# Patient Record
Sex: MALE | Race: WHITE | NOT HISPANIC OR LATINO | Employment: OTHER | URBAN - METROPOLITAN AREA
[De-identification: names, ages, dates, MRNs, and addresses within clinical notes are randomized per-mention and may not be internally consistent; named-entity substitution may affect disease eponyms.]

---

## 2017-08-16 ENCOUNTER — PREPPED CHART (OUTPATIENT)
Dept: URBAN - METROPOLITAN AREA CLINIC 40 | Facility: CLINIC | Age: 82
End: 2017-08-16

## 2017-08-17 ENCOUNTER — ESTABLISHED PATIENT (OUTPATIENT)
Dept: URBAN - METROPOLITAN AREA CLINIC 40 | Facility: CLINIC | Age: 82
End: 2017-08-17

## 2017-08-17 DIAGNOSIS — H52.4: ICD-10-CM

## 2017-08-17 DIAGNOSIS — H47.323: ICD-10-CM

## 2017-08-17 DIAGNOSIS — H52.203: ICD-10-CM

## 2017-08-17 DIAGNOSIS — H35.3131: ICD-10-CM

## 2017-08-17 DIAGNOSIS — H52.03: ICD-10-CM

## 2017-08-17 PROCEDURE — 92014 COMPRE OPH EXAM EST PT 1/>: CPT

## 2017-08-17 PROCEDURE — 92015 DETERMINE REFRACTIVE STATE: CPT

## 2017-08-17 PROCEDURE — 92250 FUNDUS PHOTOGRAPHY W/I&R: CPT

## 2017-08-17 ASSESSMENT — TONOMETRY
OS_IOP_MMHG: 18
OD_IOP_MMHG: 18

## 2017-08-17 ASSESSMENT — VISUAL ACUITY
OD_BAT: 20/30
OS_BAT: 20/40
OD_SC: 20/25
OD_CC: J2
OS_CC: J3
OU_CC: J1
OU_SC: 20/25-1
OS_SC: 20/30+2

## 2017-08-17 ASSESSMENT — KERATOMETRY
OD_AXISANGLE_DEGREES: 169
OS_K2POWER_DIOPTERS: 44.00
OD_AXISANGLE2_DEGREES: 079
OS_AXISANGLE2_DEGREES: 083
OD_K1POWER_DIOPTERS: 44.25
OS_K1POWER_DIOPTERS: 44.50
OS_AXISANGLE_DEGREES: 173
OD_K2POWER_DIOPTERS: 44.75

## 2018-04-17 NOTE — PATIENT DISCUSSION
pt has many cataract symptoms.  re-check AR/MR at next visit and make sure macular function is normal with 10-2 at next visit.

## 2018-05-21 NOTE — PATIENT DISCUSSION
normal 10-2 function.  today with KMS MR sees better than LV.  Spec Rx given at this point and if cataract shifts Rx again will need to address.

## 2018-08-21 ENCOUNTER — ESTABLISHED COMPREHENSIVE EXAM (OUTPATIENT)
Dept: URBAN - METROPOLITAN AREA CLINIC 40 | Facility: CLINIC | Age: 83
End: 2018-08-21

## 2018-08-21 DIAGNOSIS — H47.323: ICD-10-CM

## 2018-08-21 DIAGNOSIS — H35.3131: ICD-10-CM

## 2018-08-21 DIAGNOSIS — H43.393: ICD-10-CM

## 2018-08-21 DIAGNOSIS — H26.493: ICD-10-CM

## 2018-08-21 PROCEDURE — 92250 FUNDUS PHOTOGRAPHY W/I&R: CPT

## 2018-08-21 PROCEDURE — 92015 DETERMINE REFRACTIVE STATE: CPT

## 2018-08-21 PROCEDURE — G9903 PT SCRN TBCO ID AS NON USER: HCPCS

## 2018-08-21 PROCEDURE — 2019F DILATED MACUL EXAM DONE: CPT

## 2018-08-21 PROCEDURE — 4177F TALK PT/CRGVR RE AREDS PREV: CPT

## 2018-08-21 PROCEDURE — 1036F TOBACCO NON-USER: CPT

## 2018-08-21 PROCEDURE — 92014 COMPRE OPH EXAM EST PT 1/>: CPT

## 2018-08-21 PROCEDURE — G8427 DOCREV CUR MEDS BY ELIG CLIN: HCPCS

## 2018-08-21 ASSESSMENT — KERATOMETRY
OD_K1POWER_DIOPTERS: 44.50
OD_AXISANGLE2_DEGREES: 050
OS_AXISANGLE_DEGREES: 157
OS_AXISANGLE2_DEGREES: 067
OD_AXISANGLE_DEGREES: 140
OD_K2POWER_DIOPTERS: 44.75
OS_K1POWER_DIOPTERS: 44.25
OS_K2POWER_DIOPTERS: 45.00

## 2018-08-21 ASSESSMENT — TONOMETRY
OD_IOP_MMHG: 13
OS_IOP_MMHG: 14

## 2018-08-21 ASSESSMENT — VISUAL ACUITY
OS_SC: 20/20-1
OD_SC: 20/30-2
OS_CC: J2
OU_SC: 20/25
OD_CC: J1
OD_BAT: 20/40
OU_CC: J1
OS_BAT: 20/25

## 2018-09-20 NOTE — PATIENT DISCUSSION
New Prescription: Maxitrol (neomycin-polymyxin-dexameth): drops,suspension: 3.5-10,000-0.1 mg/mL-unit/mL-% 1 drop four times a day as directed into both eyes 09-

## 2018-09-20 NOTE — PATIENT DISCUSSION
MUCOPURULENT CONJUNCTIVITIS OU: Maxitrol QID for 1 week. Will follow-up in 1 week. If condition is improving, may consider taper at that time. If no improvement, will use poviodine treatment. Cold compresses QID OU or more.

## 2018-09-27 NOTE — PATIENT DISCUSSION
Continue: Maxitrol (neomycin-polymyxin-dexameth): drops,suspension: 3.5-10,000-0.1 mg/mL-unit/mL-% 1 drop four times a day as directed into both eyes 09-

## 2018-09-27 NOTE — PATIENT DISCUSSION
MUCOPURULENT CONJUNCTIVITIS OU: Symptoms much improved. Cont Maxitrol BID for 1 week then d/c. Cold compresses PRN. Will follow up with patient in 2 weeks for a CEE.

## 2018-10-26 NOTE — PATIENT DISCUSSION
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

## 2019-03-26 ENCOUNTER — EST. PATIENT EMERGENCY (OUTPATIENT)
Dept: URBAN - METROPOLITAN AREA CLINIC 40 | Facility: CLINIC | Age: 84
End: 2019-03-26

## 2019-03-26 DIAGNOSIS — H02.836: ICD-10-CM

## 2019-03-26 DIAGNOSIS — H02.833: ICD-10-CM

## 2019-03-26 PROCEDURE — 92012 INTRM OPH EXAM EST PATIENT: CPT

## 2019-03-26 ASSESSMENT — KERATOMETRY
OS_AXISANGLE2_DEGREES: 067
OS_AXISANGLE_DEGREES: 157
OD_AXISANGLE2_DEGREES: 050
OD_AXISANGLE_DEGREES: 140
OD_K2POWER_DIOPTERS: 44.75
OS_K1POWER_DIOPTERS: 44.25
OD_K1POWER_DIOPTERS: 44.50
OS_K2POWER_DIOPTERS: 45.00

## 2019-03-26 ASSESSMENT — VISUAL ACUITY
OD_SC: 20/40
OS_SC: 20/30

## 2019-04-30 ASSESSMENT — KERATOMETRY
OD_K2POWER_DIOPTERS: 44.75
OD_AXISANGLE_DEGREES: 140
OD_AXISANGLE2_DEGREES: 050
OS_AXISANGLE2_DEGREES: 067
OD_K1POWER_DIOPTERS: 44.50
OS_AXISANGLE_DEGREES: 157
OS_K1POWER_DIOPTERS: 44.25
OS_K2POWER_DIOPTERS: 45.00

## 2019-05-01 ENCOUNTER — CONSULT (OUTPATIENT)
Dept: URBAN - METROPOLITAN AREA CLINIC 39 | Facility: CLINIC | Age: 84
End: 2019-05-01

## 2019-05-01 DIAGNOSIS — H02.403: ICD-10-CM

## 2019-05-01 PROCEDURE — 92285 EXTERNAL OCULAR PHOTOGRAPHY: CPT

## 2019-05-01 PROCEDURE — 99213 OFFICE O/P EST LOW 20 MIN: CPT

## 2019-05-01 ASSESSMENT — VISUAL ACUITY
OD_PH: 20/40-1
OS_SC: 20/50-2
OD_SC: 20/60
OS_PH: 20/40

## 2019-06-25 ENCOUNTER — ESTABLISHED COMPREHENSIVE EXAM (OUTPATIENT)
Dept: URBAN - METROPOLITAN AREA CLINIC 40 | Facility: CLINIC | Age: 84
End: 2019-06-25

## 2019-06-25 DIAGNOSIS — H35.3131: ICD-10-CM

## 2019-06-25 DIAGNOSIS — H47.323: ICD-10-CM

## 2019-06-25 DIAGNOSIS — H26.493: ICD-10-CM

## 2019-06-25 DIAGNOSIS — H02.403: ICD-10-CM

## 2019-06-25 DIAGNOSIS — H43.393: ICD-10-CM

## 2019-06-25 DIAGNOSIS — H02.836: ICD-10-CM

## 2019-06-25 DIAGNOSIS — H02.833: ICD-10-CM

## 2019-06-25 DIAGNOSIS — H52.4: ICD-10-CM

## 2019-06-25 DIAGNOSIS — H52.203: ICD-10-CM

## 2019-06-25 PROCEDURE — 92014 COMPRE OPH EXAM EST PT 1/>: CPT

## 2019-06-25 PROCEDURE — 92015 DETERMINE REFRACTIVE STATE: CPT

## 2019-06-25 PROCEDURE — 92250 FUNDUS PHOTOGRAPHY W/I&R: CPT

## 2019-06-25 ASSESSMENT — KERATOMETRY
OD_K1POWER_DIOPTERS: 44.5
OS_AXISANGLE_DEGREES: 1
OS_K1POWER_DIOPTERS: 44.25
OD_K1POWER_DIOPTERS: 44.50
OD_AXISANGLE2_DEGREES: 114
OD_K2POWER_DIOPTERS: 44.75
OS_AXISANGLE_DEGREES: 157
OD_AXISANGLE_DEGREES: 140
OD_K2POWER_DIOPTERS: 44.25
OS_K1POWER_DIOPTERS: 44.5
OD_AXISANGLE2_DEGREES: 050
OS_AXISANGLE2_DEGREES: 067
OD_AXISANGLE_DEGREES: 24
OS_K2POWER_DIOPTERS: 44.25
OS_K2POWER_DIOPTERS: 45.00
OS_AXISANGLE2_DEGREES: 91

## 2019-06-25 ASSESSMENT — VISUAL ACUITY
OD_BAT: 20/50
OD_CC: J2
OU_CC: J2
OS_SC: 20/25
OS_CC: J3
OD_SC: 20/40-1
OS_BAT: 20/40

## 2019-06-25 ASSESSMENT — TONOMETRY
OS_IOP_MMHG: 17
OD_IOP_MMHG: 16

## 2020-06-30 ENCOUNTER — ESTABLISHED COMPREHENSIVE EXAM (OUTPATIENT)
Dept: URBAN - METROPOLITAN AREA CLINIC 40 | Facility: CLINIC | Age: 85
End: 2020-06-30

## 2020-06-30 DIAGNOSIS — H02.836: ICD-10-CM

## 2020-06-30 DIAGNOSIS — H52.4: ICD-10-CM

## 2020-06-30 DIAGNOSIS — H35.3131: ICD-10-CM

## 2020-06-30 DIAGNOSIS — H52.203: ICD-10-CM

## 2020-06-30 DIAGNOSIS — H43.393: ICD-10-CM

## 2020-06-30 DIAGNOSIS — H02.403: ICD-10-CM

## 2020-06-30 DIAGNOSIS — H47.323: ICD-10-CM

## 2020-06-30 DIAGNOSIS — H26.493: ICD-10-CM

## 2020-06-30 DIAGNOSIS — Z96.1: ICD-10-CM

## 2020-06-30 DIAGNOSIS — H02.833: ICD-10-CM

## 2020-06-30 PROCEDURE — 92015 DETERMINE REFRACTIVE STATE: CPT

## 2020-06-30 PROCEDURE — 92250 FUNDUS PHOTOGRAPHY W/I&R: CPT

## 2020-06-30 PROCEDURE — 92014 COMPRE OPH EXAM EST PT 1/>: CPT

## 2020-06-30 ASSESSMENT — VISUAL ACUITY
OU_SC: 20/25
OS_SC: 20/25
OD_CC: J3
OS_CC: J2
OD_SC: 20/40-1
OU_CC: J2
OD_PH: 20/30

## 2020-06-30 ASSESSMENT — KERATOMETRY
OD_K1POWER_DIOPTERS: 44.5
OD_AXISANGLE2_DEGREES: 050
OS_K2POWER_DIOPTERS: 44.25
OD_K2POWER_DIOPTERS: 44.75
OS_AXISANGLE_DEGREES: 1
OS_AXISANGLE2_DEGREES: 91
OS_K1POWER_DIOPTERS: 44.25
OS_K1POWER_DIOPTERS: 44.75
OS_K1POWER_DIOPTERS: 44.5
OD_AXISANGLE_DEGREES: 24
OD_AXISANGLE2_DEGREES: 124
OD_AXISANGLE2_DEGREES: 114
OD_AXISANGLE_DEGREES: 140
OS_AXISANGLE2_DEGREES: 103
OD_K1POWER_DIOPTERS: 44.50
OS_K2POWER_DIOPTERS: 44.5
OD_K2POWER_DIOPTERS: 44
OD_AXISANGLE_DEGREES: 34
OS_K2POWER_DIOPTERS: 45.00
OS_AXISANGLE_DEGREES: 13
OD_K2POWER_DIOPTERS: 44.25
OS_AXISANGLE_DEGREES: 157
OS_AXISANGLE2_DEGREES: 067

## 2020-06-30 ASSESSMENT — TONOMETRY
OS_IOP_MMHG: 17
OD_IOP_MMHG: 17

## 2021-02-05 NOTE — PATIENT DISCUSSION
MUCOPURULENT CONJUNCTIVITIS, OS: Likely bacterial based on findings today. RX Maxitrol TID for 1 week then d/c. Cold compresses PRN. Patient ed is contagious, recommend frequent hand washing, washing all linens. Will follow up with patient in 2 weeks for a CEE.

## 2021-02-05 NOTE — PATIENT DISCUSSION
New Prescription: Maxitrol (neomycin-polymyxin-dexameth): drops,suspension: 3.5-10,000-0.1 mg/mL-unit/mL-% 1 drop three times a day into left eye 02-

## 2021-02-17 NOTE — PATIENT DISCUSSION
CATARACT, OU - VISUALLY SIGNIFICANT OS&gt;OD. PATIENT ED THAT NEW GLS WOULD NOT SIGNIFICANTLY IMPROVE VISION. SCHEDULE CAT CONSULT WITH DR. Anushka Reese.

## 2021-02-18 NOTE — PATIENT DISCUSSION
CATARACTS, OU - VISUALLY SIGNIFICANT BUT PT WISHES TO WAIT FOR SURGERY. GAVE RX FOR GLASSES TO PERK UP VISION.  WILL SEE BACK NEXT YEARS TO REEVALUATE

## 2021-07-28 ENCOUNTER — ESTABLISHED COMPREHENSIVE EXAM (OUTPATIENT)
Dept: URBAN - METROPOLITAN AREA CLINIC 40 | Facility: CLINIC | Age: 86
End: 2021-07-28

## 2021-07-28 DIAGNOSIS — H47.323: ICD-10-CM

## 2021-07-28 DIAGNOSIS — H02.403: ICD-10-CM

## 2021-07-28 DIAGNOSIS — H26.493: ICD-10-CM

## 2021-07-28 DIAGNOSIS — H02.836: ICD-10-CM

## 2021-07-28 DIAGNOSIS — H35.3131: ICD-10-CM

## 2021-07-28 DIAGNOSIS — H52.203: ICD-10-CM

## 2021-07-28 DIAGNOSIS — H52.4: ICD-10-CM

## 2021-07-28 DIAGNOSIS — H02.833: ICD-10-CM

## 2021-07-28 DIAGNOSIS — H43.393: ICD-10-CM

## 2021-07-28 PROCEDURE — 92015 DETERMINE REFRACTIVE STATE: CPT

## 2021-07-28 PROCEDURE — 92014 COMPRE OPH EXAM EST PT 1/>: CPT

## 2021-07-28 PROCEDURE — 92250 FUNDUS PHOTOGRAPHY W/I&R: CPT

## 2021-07-28 ASSESSMENT — KERATOMETRY
OD_AXISANGLE2_DEGREES: 114
OD_AXISANGLE_DEGREES: 140
OS_AXISANGLE2_DEGREES: 103
OS_AXISANGLE_DEGREES: 13
OS_K1POWER_DIOPTERS: 44.75
OS_AXISANGLE2_DEGREES: 067
OD_K2POWER_DIOPTERS: 44.25
OS_K2POWER_DIOPTERS: 44.5
OS_K2POWER_DIOPTERS: 45.00
OS_AXISANGLE_DEGREES: 157
OD_AXISANGLE_DEGREES: 34
OD_K2POWER_DIOPTERS: 44.75
OD_K1POWER_DIOPTERS: 44.5
OS_AXISANGLE_DEGREES: 1
OS_AXISANGLE2_DEGREES: 91
OD_K2POWER_DIOPTERS: 44
OD_K1POWER_DIOPTERS: 44.50
OD_AXISANGLE2_DEGREES: 124
OS_K2POWER_DIOPTERS: 44.25
OS_K1POWER_DIOPTERS: 44.5
OS_K1POWER_DIOPTERS: 44.25
OD_AXISANGLE_DEGREES: 24
OD_AXISANGLE2_DEGREES: 050

## 2021-07-28 ASSESSMENT — VISUAL ACUITY
OS_CC: J3
OU_CC: J1
OS_SC: 20/25
OS_BAT: 20/40
OD_BAT: 20/40
OD_CC: J2
OD_SC: 20/40

## 2021-07-28 ASSESSMENT — TONOMETRY
OD_IOP_MMHG: 17
OS_IOP_MMHG: 18

## 2022-05-16 NOTE — PATIENT DISCUSSION
Approaching visual significance. However, pt indicates not consistently interfering with activities of daily living. Glasses rx given.

## 2022-05-16 NOTE — PATIENT DISCUSSION
Patient to use artificial tears every hour for a week and see if any improvement. If no improvement call the office to have punctas flushed.

## 2022-08-17 ASSESSMENT — KERATOMETRY
OS_K1POWER_DIOPTERS: 44.75
OS_K2POWER_DIOPTERS: 44.5
OS_K2POWER_DIOPTERS: 44.25
OS_AXISANGLE2_DEGREES: 91
OS_K1POWER_DIOPTERS: 44.5
OS_AXISANGLE2_DEGREES: 103
OD_K2POWER_DIOPTERS: 44.75
OD_K2POWER_DIOPTERS: 44
OS_AXISANGLE_DEGREES: 1
OS_AXISANGLE2_DEGREES: 067
OD_AXISANGLE2_DEGREES: 124
OS_K2POWER_DIOPTERS: 45.00
OS_AXISANGLE_DEGREES: 13
OD_AXISANGLE2_DEGREES: 114
OD_AXISANGLE_DEGREES: 140
OD_AXISANGLE_DEGREES: 24
OS_K1POWER_DIOPTERS: 44.25
OD_AXISANGLE2_DEGREES: 050
OD_K2POWER_DIOPTERS: 44.25
OD_K1POWER_DIOPTERS: 44.50
OD_AXISANGLE_DEGREES: 34
OS_AXISANGLE_DEGREES: 157
OD_K1POWER_DIOPTERS: 44.5

## 2022-08-18 ENCOUNTER — PREPPED CHART (OUTPATIENT)
Dept: URBAN - METROPOLITAN AREA CLINIC 40 | Facility: CLINIC | Age: 87
End: 2022-08-18